# Patient Record
Sex: MALE | URBAN - METROPOLITAN AREA
[De-identification: names, ages, dates, MRNs, and addresses within clinical notes are randomized per-mention and may not be internally consistent; named-entity substitution may affect disease eponyms.]

---

## 2024-03-05 ENCOUNTER — APPOINTMENT (OUTPATIENT)
Dept: RADIOLOGY | Facility: MEDICAL CENTER | Age: 59
End: 2024-03-05
Attending: PSYCHIATRY & NEUROLOGY
Payer: COMMERCIAL

## 2024-03-05 DIAGNOSIS — G40.009 BENIGN FOCAL EPILEPSY OF CHILDHOOD (HCC): ICD-10-CM

## 2024-03-05 PROCEDURE — 700117 HCHG RX CONTRAST REV CODE 255: Performed by: PSYCHIATRY & NEUROLOGY

## 2024-03-05 PROCEDURE — A9579 GAD-BASE MR CONTRAST NOS,1ML: HCPCS | Performed by: PSYCHIATRY & NEUROLOGY

## 2024-03-05 PROCEDURE — 70553 MRI BRAIN STEM W/O & W/DYE: CPT

## 2024-03-05 RX ADMIN — GADOTERIDOL 15 ML: 279.3 INJECTION, SOLUTION INTRAVENOUS at 09:10

## 2024-03-25 ENCOUNTER — HOSPITAL ENCOUNTER (OUTPATIENT)
Dept: RADIOLOGY | Facility: MEDICAL CENTER | Age: 59
End: 2024-03-25
Attending: PSYCHIATRY & NEUROLOGY
Payer: COMMERCIAL

## 2024-03-25 DIAGNOSIS — M81.0 AGE-RELATED OSTEOPOROSIS WITHOUT CURRENT PATHOLOGICAL FRACTURE: ICD-10-CM

## 2024-03-25 PROCEDURE — 77080 DXA BONE DENSITY AXIAL: CPT

## 2024-06-03 ENCOUNTER — APPOINTMENT (OUTPATIENT)
Dept: RADIOLOGY | Facility: IMAGING CENTER | Age: 59
End: 2024-06-03
Attending: FAMILY MEDICINE
Payer: COMMERCIAL

## 2024-06-03 ENCOUNTER — OFFICE VISIT (OUTPATIENT)
Dept: URGENT CARE | Facility: CLINIC | Age: 59
End: 2024-06-03
Payer: COMMERCIAL

## 2024-06-03 VITALS
SYSTOLIC BLOOD PRESSURE: 118 MMHG | HEART RATE: 60 BPM | DIASTOLIC BLOOD PRESSURE: 66 MMHG | HEIGHT: 68 IN | WEIGHT: 185 LBS | OXYGEN SATURATION: 99 % | TEMPERATURE: 97.8 F | RESPIRATION RATE: 16 BRPM | BODY MASS INDEX: 28.04 KG/M2

## 2024-06-03 DIAGNOSIS — R05.1 ACUTE COUGH: ICD-10-CM

## 2024-06-03 DIAGNOSIS — R07.81 RIB PAIN: ICD-10-CM

## 2024-06-03 DIAGNOSIS — J90 PLEURAL EFFUSION: ICD-10-CM

## 2024-06-03 PROCEDURE — 3074F SYST BP LT 130 MM HG: CPT | Performed by: FAMILY MEDICINE

## 2024-06-03 PROCEDURE — 99214 OFFICE O/P EST MOD 30 MIN: CPT | Performed by: FAMILY MEDICINE

## 2024-06-03 PROCEDURE — 71046 X-RAY EXAM CHEST 2 VIEWS: CPT | Mod: TC | Performed by: RADIOLOGY

## 2024-06-03 PROCEDURE — 3078F DIAST BP <80 MM HG: CPT | Performed by: FAMILY MEDICINE

## 2024-06-03 RX ORDER — DORZOLAMIDE HYDROCHLORIDE AND TIMOLOL MALEATE PRESERVATIVE FREE 20; 5 MG/ML; MG/ML
SOLUTION/ DROPS OPHTHALMIC
COMMUNITY

## 2024-06-03 RX ORDER — LEVETIRACETAM 1000 MG/1
1 TABLET ORAL 2 TIMES DAILY
COMMUNITY

## 2024-06-03 RX ORDER — CHROMIUM 200 MCG
TABLET ORAL
COMMUNITY
End: 2024-06-05

## 2024-06-03 RX ORDER — LEVETIRACETAM 500 MG/1
TABLET ORAL
COMMUNITY

## 2024-06-03 RX ORDER — PYRIDOXINE HCL (VITAMIN B6) 25 MG
1 TABLET ORAL
COMMUNITY

## 2024-06-03 RX ORDER — SACCHAROMYCES BOULARDII 250 MG
CAPSULE ORAL
COMMUNITY

## 2024-06-03 RX ORDER — MECLIZINE HYDROCHLORIDE 25 MG/1
12.5 TABLET ORAL
COMMUNITY

## 2024-06-03 RX ORDER — PHENYTOIN SODIUM 100 MG/1
5 CAPSULE, EXTENDED RELEASE ORAL
COMMUNITY
End: 2024-06-05

## 2024-06-03 RX ORDER — LACOSAMIDE 150 MG/1
2 TABLET ORAL 2 TIMES DAILY
COMMUNITY
Start: 2024-05-11

## 2024-06-03 RX ORDER — MIDAZOLAM 5 MG/.1ML
SPRAY NASAL
COMMUNITY
Start: 2024-04-08

## 2024-06-03 RX ORDER — MULTIVIT WITH MINERALS/LUTEIN
2 TABLET ORAL
COMMUNITY

## 2024-06-03 ASSESSMENT — ENCOUNTER SYMPTOMS
FEVER: 0
COUGH: 1

## 2024-06-03 NOTE — PROGRESS NOTES
Subjective:     Calin Caballero is a 59 y.o. male who presents for Rib Pain (Patient coming in for R rib pain x 4 days  difficulty breathing when taking deep breaths  x a couple weeks )    HPI  Pt presents for evaluation of an acute problem  Pt with right tib pain intermittently for the past 4 days   Has had some cough and feeling somewhat SOB at times for the past 4 weeks   Cough is mildly productive   The right rib pain is worse at night   Has some difficulties taking deep breath at times  Feeling somewhat fatigued  No fevers  No weight loss    Review of Systems   Constitutional:  Negative for fever.   Respiratory:  Positive for cough.    Skin:  Negative for rash.       PMH:  has no past medical history on file.  MEDS:   Current Outpatient Medications:     Ascorbic Acid (VITAMIN C) 1000 MG Tab, Take 2 Tablets by mouth every day., Disp: , Rfl:     Dorzolamide-Timolol (COSOPT PF) 2-0.5 % Solution, 1 drop into affected eye Ophthalmic Twice a day, Disp: , Rfl:     Lacosamide 150 MG Tab, Take 2 Tablets by mouth 2 times a day., Disp: , Rfl:     levetiracetam (KEPPRA) 1000 MG tablet, Take 1 Tablet by mouth 2 times a day., Disp: , Rfl:     levETIRAcetam (KEPPRA) 500 MG Tab, TAKE 3 TABLETS ORALLY TWICE A DAY, Disp: , Rfl:     L-Lysine 500 MG Cap, as directed Orally Twice daily, Disp: , Rfl:     meclizine (ANTIVERT) 25 MG Tab, Take 12.5 mg by mouth., Disp: , Rfl:     multivitamin Tab, Take 1 Tablet by mouth every day., Disp: , Rfl:     phenytoin ER (DILANTIN) 100 MG Cap, 5 Capsules., Disp: , Rfl:     saccharomyces boulardii (FLORASTOR) 250 MG Cap, as directed Orally, Disp: , Rfl:     pyridoxine (VITAMIN B-6) 25 MG Tab, Take 1 Tablet by mouth every day., Disp: , Rfl:     pyridoxine 100 MG tablet, Take 1 Tablet by mouth every day., Disp: , Rfl:     NAYZILAM 5 MG/0.1ML Solution, ONE SPRAY IN ONE NOSTRIL DAILY AS NEEDED FOR SEIZURE, Disp: , Rfl:   ALLERGIES: No Known Allergies  SURGHX: No past surgical history on file.  SOCHX:   "     Objective:   /66   Pulse 60   Temp 36.6 °C (97.8 °F) (Temporal)   Resp 16   Ht 1.727 m (5' 8\")   Wt 83.9 kg (185 lb)   SpO2 99%   BMI 28.13 kg/m²     Physical Exam  Constitutional:       General: He is not in acute distress.     Appearance: He is well-developed. He is not diaphoretic.   HENT:      Head: Normocephalic and atraumatic.      Right Ear: Tympanic membrane, ear canal and external ear normal.      Left Ear: Tympanic membrane, ear canal and external ear normal.      Nose: Nose normal.      Mouth/Throat:      Mouth: Mucous membranes are moist.      Pharynx: Oropharynx is clear. No oropharyngeal exudate or posterior oropharyngeal erythema.   Neck:      Trachea: No tracheal deviation.   Cardiovascular:      Rate and Rhythm: Normal rate and regular rhythm.      Heart sounds: Normal heart sounds.   Pulmonary:      Effort: Pulmonary effort is normal. No respiratory distress.      Breath sounds: Normal breath sounds. No wheezing or rales.   Musculoskeletal:         General: Normal range of motion.      Cervical back: Normal range of motion and neck supple. No tenderness.   Lymphadenopathy:      Cervical: No cervical adenopathy.   Skin:     General: Skin is warm and dry.      Findings: No rash.   Neurological:      Mental Status: He is alert.   Psychiatric:         Behavior: Behavior normal.         Thought Content: Thought content normal.         Judgment: Judgment normal.         Assessment/Plan:   Assessment    1. Rib pain  - DX-CHEST-2 VIEWS; Future  - CT-CHEST (THORAX) WITH; Future    2. Acute cough  - DX-CHEST-2 VIEWS; Future  - CT-CHEST (THORAX) WITH; Future    3. Pleural effusion  - CT-CHEST (THORAX) WITH; Future  - CBC WITH DIFFERENTIAL; Future  - Comp Metabolic Panel; Future  - proBrain Natriuretic Peptide, NT; Future    Patient with right-sided rib pain.  He has had a very mild cough recently, but do not suspect that this has been enough to cause pleurisy.  On x-ray, has a small pleural " effusion on the right side.  Pleural effusion is very slight, but suspicious that it is on the same side that he is having symptoms.  Did recommend follow-up imaging and further evaluation with labs.  Patient agreeable and have scheduled CT and labs to be done.  Will follow-up test results.

## 2024-06-04 ENCOUNTER — HOSPITAL ENCOUNTER (OUTPATIENT)
Dept: RADIOLOGY | Facility: MEDICAL CENTER | Age: 59
End: 2024-06-04
Attending: FAMILY MEDICINE
Payer: COMMERCIAL

## 2024-06-04 ENCOUNTER — HOSPITAL ENCOUNTER (OUTPATIENT)
Dept: LAB | Facility: MEDICAL CENTER | Age: 59
End: 2024-06-04
Attending: FAMILY MEDICINE
Payer: COMMERCIAL

## 2024-06-04 DIAGNOSIS — R05.1 ACUTE COUGH: ICD-10-CM

## 2024-06-04 DIAGNOSIS — J90 PLEURAL EFFUSION: ICD-10-CM

## 2024-06-04 DIAGNOSIS — R07.81 RIB PAIN: ICD-10-CM

## 2024-06-04 LAB
ALBUMIN SERPL BCP-MCNC: 4.2 G/DL (ref 3.2–4.9)
ALBUMIN/GLOB SERPL: 1.1 G/DL
ALP SERPL-CCNC: 123 U/L (ref 30–99)
ALT SERPL-CCNC: 26 U/L (ref 2–50)
ANION GAP SERPL CALC-SCNC: 11 MMOL/L (ref 7–16)
AST SERPL-CCNC: 23 U/L (ref 12–45)
BASOPHILS # BLD AUTO: 0.7 % (ref 0–1.8)
BASOPHILS # BLD: 0.06 K/UL (ref 0–0.12)
BILIRUB SERPL-MCNC: 0.5 MG/DL (ref 0.1–1.5)
BUN SERPL-MCNC: 18 MG/DL (ref 8–22)
CALCIUM ALBUM COR SERPL-MCNC: 9.3 MG/DL (ref 8.5–10.5)
CALCIUM SERPL-MCNC: 9.5 MG/DL (ref 8.4–10.2)
CHLORIDE SERPL-SCNC: 103 MMOL/L (ref 96–112)
CO2 SERPL-SCNC: 26 MMOL/L (ref 20–33)
CREAT SERPL-MCNC: 0.77 MG/DL (ref 0.5–1.4)
EOSINOPHIL # BLD AUTO: 0.11 K/UL (ref 0–0.51)
EOSINOPHIL NFR BLD: 1.4 % (ref 0–6.9)
ERYTHROCYTE [DISTWIDTH] IN BLOOD BY AUTOMATED COUNT: 42.5 FL (ref 35.9–50)
FASTING STATUS PATIENT QL REPORTED: NORMAL
GFR SERPLBLD CREATININE-BSD FMLA CKD-EPI: 102 ML/MIN/1.73 M 2
GLOBULIN SER CALC-MCNC: 3.7 G/DL (ref 1.9–3.5)
GLUCOSE SERPL-MCNC: 95 MG/DL (ref 65–99)
HCT VFR BLD AUTO: 49 % (ref 42–52)
HGB BLD-MCNC: 16.9 G/DL (ref 14–18)
IMM GRANULOCYTES # BLD AUTO: 0.02 K/UL (ref 0–0.11)
IMM GRANULOCYTES NFR BLD AUTO: 0.2 % (ref 0–0.9)
LYMPHOCYTES # BLD AUTO: 1.86 K/UL (ref 1–4.8)
LYMPHOCYTES NFR BLD: 23.1 % (ref 22–41)
MCH RBC QN AUTO: 32.4 PG (ref 27–33)
MCHC RBC AUTO-ENTMCNC: 34.5 G/DL (ref 32.3–36.5)
MCV RBC AUTO: 94 FL (ref 81.4–97.8)
MONOCYTES # BLD AUTO: 0.81 K/UL (ref 0–0.85)
MONOCYTES NFR BLD AUTO: 10.1 % (ref 0–13.4)
NEUTROPHILS # BLD AUTO: 5.18 K/UL (ref 1.82–7.42)
NEUTROPHILS NFR BLD: 64.5 % (ref 44–72)
NRBC # BLD AUTO: 0 K/UL
NRBC BLD-RTO: 0 /100 WBC (ref 0–0.2)
NT-PROBNP SERPL IA-MCNC: 56 PG/ML (ref 0–125)
PLATELET # BLD AUTO: 236 K/UL (ref 164–446)
PMV BLD AUTO: 9.5 FL (ref 9–12.9)
POTASSIUM SERPL-SCNC: 5.4 MMOL/L (ref 3.6–5.5)
PROT SERPL-MCNC: 7.9 G/DL (ref 6–8.2)
RBC # BLD AUTO: 5.21 M/UL (ref 4.7–6.1)
SODIUM SERPL-SCNC: 140 MMOL/L (ref 135–145)
WBC # BLD AUTO: 8 K/UL (ref 4.8–10.8)

## 2024-06-04 PROCEDURE — 80053 COMPREHEN METABOLIC PANEL: CPT

## 2024-06-04 PROCEDURE — 85025 COMPLETE CBC W/AUTO DIFF WBC: CPT

## 2024-06-04 PROCEDURE — 700117 HCHG RX CONTRAST REV CODE 255: Performed by: FAMILY MEDICINE

## 2024-06-04 PROCEDURE — 83880 ASSAY OF NATRIURETIC PEPTIDE: CPT

## 2024-06-04 PROCEDURE — 71260 CT THORAX DX C+: CPT

## 2024-06-04 PROCEDURE — 36415 COLL VENOUS BLD VENIPUNCTURE: CPT

## 2024-06-04 RX ADMIN — IOHEXOL 75 ML: 350 INJECTION, SOLUTION INTRAVENOUS at 17:44

## 2024-06-05 ENCOUNTER — TELEPHONE (OUTPATIENT)
Dept: SPORTS MEDICINE | Facility: OTHER | Age: 59
End: 2024-06-05
Payer: COMMERCIAL

## 2024-06-05 DIAGNOSIS — J90 PLEURAL EFFUSION: ICD-10-CM

## 2024-06-05 RX ORDER — DOXYCYCLINE HYCLATE 100 MG
100 TABLET ORAL 2 TIMES DAILY
Qty: 14 TABLET | Refills: 0 | Status: SHIPPED | OUTPATIENT
Start: 2024-06-05 | End: 2024-06-12

## 2024-06-05 NOTE — TELEPHONE ENCOUNTER
Reviewed patient's test results with him.  He has a new pleural effusion of unclear etiology.  Recommended starting on course of antibiotics and having close follow-up with pulmonology.  If effusion is not resolving quickly, should have thoracentesis to test fluid.  Patient agreeable with plan and will follow-up with pulmonology as soon as he is able.

## 2024-06-12 ENCOUNTER — TELEPHONE (OUTPATIENT)
Dept: SPORTS MEDICINE | Facility: OTHER | Age: 59
End: 2024-06-12
Payer: COMMERCIAL

## 2024-06-12 DIAGNOSIS — J90 PLEURAL EFFUSION ON RIGHT: ICD-10-CM

## 2024-06-12 NOTE — TELEPHONE ENCOUNTER
Informed by referral department that pulmonology unable to see patient in a timely manner and would like urgent care to order thoracentesis.  Since our last phone call, patient has already seen a pulmonologist in [place] and had repeat chest CT completed.  He has thoracentesis ordered and is waiting to schedule.  Inform patient that I did also put in the orders to have his procedure done through [facility] if he would like.  Patient currently planning to go through [facility], but will schedule through [facility] if he is not able to get anything set up through [facility] quickly.

## 2025-01-21 ENCOUNTER — HOSPITAL ENCOUNTER (OUTPATIENT)
Facility: MEDICAL CENTER | Age: 60
End: 2025-01-21
Attending: INTERNAL MEDICINE
Payer: COMMERCIAL

## 2025-01-21 PROCEDURE — 87046 STOOL CULTR AEROBIC BACT EA: CPT

## 2025-01-21 PROCEDURE — 87045 FECES CULTURE AEROBIC BACT: CPT

## 2025-01-21 PROCEDURE — 83993 ASSAY FOR CALPROTECTIN FECAL: CPT

## 2025-01-21 PROCEDURE — 87177 OVA AND PARASITES SMEARS: CPT

## 2025-01-21 PROCEDURE — 87209 SMEAR COMPLEX STAIN: CPT

## 2025-01-21 PROCEDURE — 87899 AGENT NOS ASSAY W/OPTIC: CPT

## 2025-01-22 ENCOUNTER — HOSPITAL ENCOUNTER (OUTPATIENT)
Facility: MEDICAL CENTER | Age: 60
End: 2025-01-22
Attending: INTERNAL MEDICINE
Payer: COMMERCIAL

## 2025-01-22 LAB
E COLI SXT1+2 STL IA: NORMAL
H PYLORI AG STL QL IA: NOT DETECTED
SIGNIFICANT IND 70042: NORMAL
SITE SITE: NORMAL
SOURCE SOURCE: NORMAL

## 2025-01-22 PROCEDURE — 87338 HPYLORI STOOL AG IA: CPT

## 2025-01-23 LAB — CALPROTECTIN STL-MCNT: 13 UG/G

## 2025-01-24 LAB
BACTERIA STL CULT: NORMAL
E COLI SXT1+2 STL IA: NORMAL
SIGNIFICANT IND 70042: NORMAL
SITE SITE: NORMAL
SOURCE SOURCE: NORMAL

## 2025-01-27 LAB — OVA AND PARASITE, FECAL INTERPRETATION Q0595: NEGATIVE
